# Patient Record
Sex: FEMALE | Race: WHITE | Employment: FULL TIME | URBAN - METROPOLITAN AREA
[De-identification: names, ages, dates, MRNs, and addresses within clinical notes are randomized per-mention and may not be internally consistent; named-entity substitution may affect disease eponyms.]

---

## 2017-01-25 ENCOUNTER — TELEPHONE (OUTPATIENT)
Dept: INTERNAL MEDICINE CLINIC | Age: 52
End: 2017-01-25

## 2017-01-25 ENCOUNTER — OFFICE VISIT (OUTPATIENT)
Dept: INTERNAL MEDICINE CLINIC | Age: 52
End: 2017-01-25

## 2017-01-25 VITALS
HEIGHT: 67 IN | SYSTOLIC BLOOD PRESSURE: 114 MMHG | TEMPERATURE: 97.8 F | OXYGEN SATURATION: 97 % | DIASTOLIC BLOOD PRESSURE: 74 MMHG | HEART RATE: 97 BPM | RESPIRATION RATE: 20 BRPM | BODY MASS INDEX: 26.93 KG/M2 | WEIGHT: 171.6 LBS

## 2017-01-25 DIAGNOSIS — F41.1 GENERALIZED ANXIETY DISORDER: Primary | ICD-10-CM

## 2017-01-25 PROBLEM — K63.5 HYPERPLASTIC COLON POLYP: Status: ACTIVE | Noted: 2017-01-25

## 2017-01-25 RX ORDER — IBUPROFEN 200 MG
TABLET ORAL
COMMUNITY
End: 2017-06-02 | Stop reason: DRUGHIGH

## 2017-01-25 NOTE — PATIENT INSTRUCTIONS

## 2017-01-25 NOTE — PROGRESS NOTES
Chief Complaint   Patient presents with    Medication Evaluation    Colon Cancer Screening     f/u     Room 2

## 2017-01-25 NOTE — MR AVS SNAPSHOT
Visit Information Date & Time Provider Department Dept. Phone Encounter #  
 1/25/2017 10:45 AM Raghav Ibanez MD [address] [facility] Internal Medicine [phone] [id number] Follow-up Instructions Return in about 1 month (around 2/25/2017) for 30 minutes follow-up anxiety, or earlier as needed. Nini Carloz Upcoming Health Maintenance Date Due FOBT Q 1 YEAR AGE 50-75 10/29/2016 BREAST CANCER SCRN MAMMOGRAM 12/5/2018 PAP AKA CERVICAL CYTOLOGY 11/9/2021 DTaP/Tdap/Td series (2 - Td) 11/9/2026 Allergies as of 1/25/2017  Review Complete On: 1/25/2017 By: Conrado Barlow LPN Severity Noted Reaction Type Reactions Pcn [Penicillins]  07/18/2012    Hives Current Immunizations  Never Reviewed Name Date Pneumococcal Polysaccharide (PPSV-23) 11/9/2016 Tdap 11/9/2016 Not reviewed this visit You Were Diagnosed With   
  
 Codes Comments Generalized anxiety disorder    -  Primary ICD-10-CM: F41.1 ICD-9-CM: 300.02 Vitals BP Pulse Temp Resp Height(growth percentile) Weight(growth percentile) 114/74 97 97.8 °F (36.6 °C) (Oral) 20 5' 7\" (1.702 m) 171 lb 9.6 oz (77.8 kg) LMP SpO2 BMI OB Status Smoking Status 06/25/2012 97% 26.88 kg/m2 Postmenopausal Current Every Day Smoker BMI and BSA Data Body Mass Index Body Surface Area  
 26.88 kg/m 2 1.92 m 2 Preferred Pharmacy Pharmacy Name Phone [id number] [place], [place] [phone] Your Updated Medication List  
  
   
This list is accurate as of: 1/25/17 11:37 AM.  Always use your most recent med list.  
  
  
  
  
 amitriptyline 25 mg tablet Commonly known as:  ELAVIL Take 50 mg by mouth nightly. ASPIRIN PO Take 81 mg by mouth daily. cetirizine 10 mg tablet Commonly known as:  ZYRTEC Take 1 Tab by mouth daily. ibuprofen 200 mg tablet Commonly known as:  MOTRIN  
 Take  by mouth. We Performed the Following REFERRAL TO SOCIAL WORK [UKG10 Custom] Comments:  
 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 Follow-up Instructions Return in about 1 month (around 2/25/2017) for 30 minutes follow-up anxiety, or earlier as needed. .  
  
  
Referral Information Referral ID Referred By Referred To  
  
 [id number] Wei Santillan 1 [facility] [place], [address] Phone: [phone] Visits Status Start Date End Date 1 New Request 1/25/17 1/25/18 If your referral has a status of pending review or denied, additional information will be sent to support the outcome of this decision. Patient Instructions Anxiety Disorder: Care Instructions Your Care Instructions Anxiety is a normal reaction to stress. Difficult situations can cause you to have symptoms such as sweaty palms and a nervous feeling. In an anxiety disorder, the symptoms are far more severe. Constant worry, muscle tension, trouble sleeping, nausea and diarrhea, and other symptoms can make normal daily activities difficult or impossible. These symptoms may occur for no reason, and they can affect your work, school, or social life. Medicines, counseling, and self-care can all help. Follow-up care is a key part of your treatment and safety. Be sure to make and go to all appointments, and call your doctor if you are having problems. It's also a good idea to know your test results and keep a list of the medicines you take. How can you care for yourself at home? · Take medicines exactly as directed. Call your doctor if you think you are having a problem with your medicine. · Go to your counseling sessions and follow-up appointments. · Recognize and accept your anxiety.  Then, when you are in a situation that makes you anxious, say to yourself, \"This is not an emergency. I feel uncomfortable, but I am not in danger. I can keep going even if I feel anxious. \" · Be kind to your body: ¨ Relieve tension with exercise or a massage. ¨ Get enough rest. 
¨ Avoid alcohol, caffeine, nicotine, and illegal drugs. They can increase your anxiety level and cause sleep problems. ¨ Learn and do relaxation techniques. See below for more about these techniques. · Engage your mind. Get out and do something you enjoy. Go to a [employer] movie, or take a walk or hike. Plan your day. Having too much or too little to do can make you anxious. · Keep a record of your symptoms. Discuss your fears with a good friend or family member, or join a support group for people with similar problems. Talking to others sometimes relieves stress. · Get involved in social groups, or volunteer to help others. Being alone sometimes makes things seem worse than they are. · Get at least 30 minutes of exercise on most days of the week to relieve stress. Walking is a good choice. You also may want to do other activities, such as running, swimming, cycling, or playing tennis or team sports. Relaxation techniques Do relaxation exercises 10 to 20 minutes a day. You can play soothing, relaxing music while you do them, if you wish. · Tell others in your house that you are going to do your relaxation exercises. Ask them not to disturb you. · Find a comfortable place, away from all distractions and noise. · Lie down on your back, or sit with your back straight. · Focus on your breathing. Make it slow and steady. · Breathe in through your nose. Breathe out through either your nose or mouth. · Breathe deeply, filling up the area between your navel and your rib cage. Breathe so that your belly goes up and down. · Do not hold your breath. · Breathe like this for 5 to 10 minutes. Notice the feeling of calmness throughout your whole body. As you continue to breathe slowly and deeply, relax by doing the following for another 5 to 10 minutes: · Tighten and relax each muscle group in your body. You can begin at your toes and work your way up to your head. · Imagine your muscle groups relaxing and becoming heavy. · Empty your mind of all thoughts. · Let yourself relax more and more deeply. · Become aware of the state of calmness that surrounds you. · When your relaxation time is over, you can bring yourself back to alertness by moving your fingers and toes and then your hands and feet and then stretching and moving your entire body. Sometimes people fall asleep during relaxation, but they usually wake up shortly afterward. · Always give yourself time to return to full alertness before you drive a car or do anything that might cause an accident if you are not fully alert. Never play a relaxation tape while you drive a car. When should you call for help? Call 911 anytime you think you may need emergency care. For example, call if: 
· You feel you cannot stop from hurting yourself or someone else. Keep the numbers for these national suicide hotlines: 3-966-434-TALK (9-653.880.6901) and 4-859-COSAHET (2-471.506.6106). If you or someone you know talks about suicide or feeling hopeless, get help right away. Watch closely for changes in your health, and be sure to contact your doctor if: 
· You have anxiety or fear that affects your life. · You have symptoms of anxiety that are new or different from those you had before. Where can you learn more? Go to http://shaquille-farhana.info/. Enter P754 in the search box to learn more about \"Anxiety Disorder: Care Instructions. \" Current as of: July 26, 2016 Content Version: 11.1 © 4841-2365 [employer].  Care instructions adapted under license by [employer] (which disclaims liability or warranty for this information). If you have questions about a medical condition or this instruction, always ask your healthcare professional. [address] any warranty or liability for your use of this information. Introducing [facility] SERVICES! Vidhi Blakely introduces [employer] patient portal. Now you can access parts of your medical record, email your doctor's office, and request medication refills online. 1. In your internet browser, go to [email or web address] 2. Click on the First Time User? Click Here link in the Sign In box. You will see the New Member Sign Up page. 3. Enter your [employer] Access Code exactly as it appears below. You will not need to use this code after youve completed the sign-up process. If you do not sign up before the expiration date, you must request a new code. · [employer] Access Code: [email or web address] Expires: 4/25/2017 10:16 AM 
 
4. Enter the last four digits of your Social Security Number (xxxx) and Date of Birth (mm/dd/yyyy) as indicated and click Submit. You will be taken to the next sign-up page. 5. Create a [employer] ID. This will be your [employer] login ID and cannot be changed, so think of one that is secure and easy to remember. 6. Create a [employer] password. You can change your password at any time. 7. Enter your Password Reset Question and Answer. This can be used at a later time if you forget your password. 8. Enter your e-mail address. You will receive e-mail notification when new information is available in [address] 9. Click Sign Up. You can now view and download portions of your medical record. 10. Click the Download Summary menu link to download a portable copy of your medical information. If you have questions, please visit the Frequently Asked Questions section of the [employer] website. Remember, [employer] is NOT to be used for urgent needs. For medical emergencies, dial 911. Now available from your iPhone and Android! Please provide this summary of care documentation to your next provider. Your primary care clinician is listed as Venkatesh Kirkpatrick. If you have any questions after today's visit, please call [phone].

## 2017-01-25 NOTE — PROGRESS NOTES
LAKSHMI Ibarra is a 49 y.o. female, she presents today for:    Planning for repeat colonoscopy in 1 year. Has appointment on february first for \"ear thing\",     Notes that she is under a lot of stress. Regained custody of son on May. Feels that this is a major cuases of medication. Was not guardian for 8 years. Just recently regained custodianship. No history of panic attacks. Does not feel that amitriptyline is helping with ringing in years. Currently taking 50mg nightly. Amitriptyline does increase her sleep. PMH/PSH: reviewed and updated  Sochx:  reports that she has been smoking. She has never used smokeless tobacco. She reports that she drinks alcohol. She reports that she does not use illicit drugs. Famhx: reviewed and updated     All: Allergies   Allergen Reactions    Pcn [Penicillins] Hives     Med:   Current Outpatient Prescriptions   Medication Sig    ibuprofen (MOTRIN) 200 mg tablet Take  by mouth.  amitriptyline (ELAVIL) 25 mg tablet Take 50 mg by mouth nightly.  ASPIRIN PO Take 81 mg by mouth daily.  cetirizine (ZYRTEC) 10 mg tablet Take 1 Tab by mouth daily. No current facility-administered medications for this visit. Review of Systems   Constitutional: Negative for chills, fever and weight loss. HENT: Negative for congestion. Respiratory: Negative for cough, shortness of breath and wheezing. Cardiovascular: Negative for chest pain and leg swelling. Gastrointestinal: Negative for abdominal pain, diarrhea, nausea and vomiting. Genitourinary: Negative for dysuria. Skin: Negative for rash. Neurological: Negative for dizziness and headaches. PE:  Blood pressure 114/74, pulse 97, temperature 97.8 °F (36.6 °C), temperature source Oral, resp. rate 20, height 5' 7\" (1.702 m), weight 171 lb 9.6 oz (77.8 kg), last menstrual period 06/25/2012, SpO2 97 %. Body mass index is 26.88 kg/(m^2).   Physical Exam   Constitutional: She is oriented to person, place, and time. She appears well-developed and well-nourished. No distress. HENT:   Head: Normocephalic. Mouth/Throat: Oropharynx is clear and moist.   Eyes: Conjunctivae are normal. Pupils are equal, round, and reactive to light. Neck: Neck supple. Cardiovascular: Normal rate. Pulmonary/Chest: Effort normal.   Neurological: She is alert and oriented to person, place, and time. Skin: No rash noted. Psychiatric:   Calm, not tearful   Nursing note and vitals reviewed. Labs:   No results found for any visits on 01/25/17. A/P:  49 y.o. female    ICD-10-CM ICD-9-CM    1. Generalized anxiety disorder F41.1 300.02 REFERRAL TO SOCIAL WORK     MICHELLE: To follow-up with CSW (has been referred before and not followed through). While on higher dose TCA, will hold on starting SSRI. However per patient report, likely to wean off meidcation with upcoming appointment with ENT. - encouarged her not to focus blame on son for source of anxiety as this is not helpful. Instead to realized this is from within herself, that son may trigger anxieties but this is something that she can learn to improve through therapy. Ongoing smoking: did not respond to wellbutrin. Colon poly: to have follow-up c-scope in 1 year. Follow-up Disposition:  Return in about 1 month (around 2/25/2017) for 30 minutes follow-up anxiety, or earlier as needed. .  No future appointments.

## 2017-03-30 ENCOUNTER — OFFICE VISIT (OUTPATIENT)
Dept: INTERNAL MEDICINE CLINIC | Age: 52
End: 2017-03-30

## 2017-03-30 ENCOUNTER — TELEPHONE (OUTPATIENT)
Dept: INTERNAL MEDICINE CLINIC | Age: 52
End: 2017-03-30

## 2017-03-30 VITALS
RESPIRATION RATE: 20 BRPM | WEIGHT: 178.6 LBS | TEMPERATURE: 98.2 F | SYSTOLIC BLOOD PRESSURE: 131 MMHG | HEIGHT: 67 IN | BODY MASS INDEX: 28.03 KG/M2 | HEART RATE: 70 BPM | OXYGEN SATURATION: 92 % | DIASTOLIC BLOOD PRESSURE: 83 MMHG

## 2017-03-30 DIAGNOSIS — L29.9 SCALP ITCH: Primary | ICD-10-CM

## 2017-03-30 RX ORDER — PERMETHRIN 50 MG/G
CREAM TOPICAL
Qty: 60 G | Refills: 0 | Status: SHIPPED | OUTPATIENT
Start: 2017-03-30 | End: 2017-06-02 | Stop reason: ALTCHOICE

## 2017-03-30 RX ORDER — METHYLPREDNISOLONE 4 MG/1
TABLET ORAL
Qty: 1 DOSE PACK | Refills: 0 | Status: SHIPPED | OUTPATIENT
Start: 2017-03-30 | End: 2017-06-02 | Stop reason: ALTCHOICE

## 2017-03-30 NOTE — TELEPHONE ENCOUNTER
Patient advised medication is indicated for both scabies and head lice. She doubts she has head lice because family members are unaffected.  She was encouraged to take medications prescribed and call back with update

## 2017-03-30 NOTE — PATIENT INSTRUCTIONS
Lice: Care Instructions  Your Care Instructions  Lice are tiny bugs that can live on the human body. They bite you and live on your blood. Lice are found on the head (head lice), the body (pubic lice), or clothing (body lice). Head lice are common in  and elementary school. Some people think this happens because people are not clean. But this is not true. You can treat lice and their eggs (nits) with prescription or over-the-counter medicines. After treatment, your skin may itch for a week or more. This is because of your body's reaction to the bites. Follow-up care is a key part of your treatment and safety. Be sure to make and go to all appointments, and call your doctor if you are having problems. It's also a good idea to know your test results and keep a list of the medicines you take. How can you care for yourself at home? Treat the lice  · Use the medicine, body lotion, or shampoo that your doctor recommends. Use the treatment exactly as directed. · If you have head lice or pubic lice, you may need a second treatment 7 to 10 days after the first. This is to make sure all lice are killed, including those that hatched since the first treatment. Treat itching  · Do not scratch. · Use an over-the-counter cream or calamine lotion to calm the itching. If the itching is really bad, take an over-the-counter antihistamine, such as diphenhydramine (Benadryl) or loratadine (Claritin). Read and follow instructions on the label. Do not give antihistamines to a child unless you have checked with the doctor first.  Remove nits  · After treatment, some nits may survive. You don't have to remove all of the nits. But some people use a comb to remove nits after using lice medicine, because they don't like the look of nits in the hair. The cesar are often packaged with over-the-counter lice shampoos. A flea comb that's made for dogs and cats will also work.   Prevent reinfection with lice  · Soak hairbrushes, cesar, barrettes, and other items for 10 minutes in hot water. The water should be hotter than 130°F.  · Vacuum carpets, upholstery, and mattresses. · Wash all your clothes, bedding, and cloth toys in hot water (hotter than 130°F) in a washing machine. You also can put them in a hot dryer. You can also kill lice on clothes if you dry-clean them. Or you can store the clothes in a plastic bag for 14 days. Do not spread lice  · Do not share your clothes, cesar, or other items until you treat and clean everything. · If you get treated for pubic lice, tell your sex partner or partners. They will also need treatment. When should you call for help? Call your doctor now or seek immediate medical care if:  · You have signs of a skin infection, such as:  ¨ Increased pain, swelling, warmth, and redness. ¨ Red streaks coming from an area of your skin. ¨ Pus draining from an area of your skin. ¨ A fever. Watch closely for changes in your health, and be sure to contact your doctor if:  · You see live lice or new nits after you have followed the directions for your medicine. · Anyone else in your family has lice. Where can you learn more? Go to http://shaquille-farhana.info/. Enter R349 in the search box to learn more about \"Lice: Care Instructions. \"  Current as of: October 13, 2016  Content Version: 11.2  © 5511-6781 [employer]. Care instructions adapted under license by [employer] (which disclaims liability or warranty for this information). If you have questions about a medical condition or this instruction, always ask your healthcare professional. [place] [address] any warranty or liability for your use of this information.

## 2017-03-30 NOTE — TELEPHONE ENCOUNTER
Pt request to speak with Makayla in regards to questions/concerns about the medication that as prescribed to her. Per pt, she was seen by NP Blayne Chisholm today.   #[phone]

## 2017-03-30 NOTE — MR AVS SNAPSHOT
Visit Information Date & Time Provider Department Dept. Phone Encounter #  
 3/30/2017  9:15 AM Cheryle Artis, [facility] [phone] [facility] [phone] [id number] Follow-up Instructions Return if symptoms worsen or fail to improve. Upcoming Health Maintenance Date Due FOBT Q 1 YEAR AGE 50-75 10/29/2016 BREAST CANCER SCRN MAMMOGRAM 12/5/2018 PAP AKA CERVICAL CYTOLOGY 11/9/2021 DTaP/Tdap/Td series (2 - Td) 11/9/2026 Allergies as of 3/30/2017  Review Complete On: 3/30/2017 By: Ascencion Archibald Severity Noted Reaction Type Reactions Pcn [Penicillins]  07/18/2012    Hives Current Immunizations  Never Reviewed Name Date Pneumococcal Polysaccharide (PPSV-23) 11/9/2016 Tdap 11/9/2016 Not reviewed this visit You Were Diagnosed With   
  
 Codes Comments Scalp itch    -  Primary ICD-10-CM: L29.9 ICD-9-CM: 698.9 Vitals BP Pulse Temp Resp Height(growth percentile) Weight(growth percentile) 131/83 (BP 1 Location: Right arm, BP Patient Position: Sitting) 70 98.2 °F (36.8 °C) (Oral) 20 5' 7.01\" (1.702 m) 178 lb 9.6 oz (81 kg) LMP SpO2 BMI OB Status Smoking Status 06/25/2012 92% 27.97 kg/m2 Postmenopausal Current Every Day Smoker BMI and BSA Data Body Mass Index Body Surface Area  
 27.97 kg/m 2 1.96 m 2 Preferred Pharmacy Pharmacy Name Phone [id number] [phone] Your Updated Medication List  
  
   
This list is accurate as of: 3/30/17 11:50 AM.  Always use your most recent med list.  
  
  
  
  
 amitriptyline 25 mg tablet Commonly known as:  ELAVIL Take 50 mg by mouth nightly. ASPIRIN PO Take 81 mg by mouth daily. cetirizine 10 mg tablet Commonly known as:  ZYRTEC Take 1 Tab by mouth daily. ibuprofen 200 mg tablet Commonly known as:  MOTRIN Take  by mouth. methylPREDNISolone 4 mg tablet Commonly known as:  Arielle Nightingale Use per dose pack  
  
 permethrin 5 % topical cream  
Commonly known as:  ACTICIN  
apply sparingly as directed Prescriptions Sent to Pharmacy Refills  
 permethrin (ACTICIN) 5 % topical cream 0 Sig: apply sparingly as directed Class: Normal  
 Pharmacy: [facility]/pharmacy [id number] [place] Ph #: [phone]  
 methylPREDNISolone (MEDROL DOSEPACK) 4 mg tablet 0 Sig: Use per dose pack Class: Normal  
 Pharmacy: [address] [place] [address] Ph #: [phone] We Performed the Following CBC WITH AUTOMATED DIFF [20571 CPT(R)] METABOLIC PANEL, COMPREHENSIVE [32362 CPT(R)] SED RATE (ESR) [id number] CPT(R)] Follow-up Instructions Return if symptoms worsen or fail to improve. Patient Instructions Lice: Care Instructions Your Care Instructions Lice are tiny bugs that can live on the human body. They bite you and live on your blood. Lice are found on the head (head lice), the body (pubic lice), or clothing (body lice). Head lice are common in  and elementary school. Some people think this happens because people are not clean. But this is not true. You can treat lice and their eggs (nits) with prescription or over-the-counter medicines. After treatment, your skin may itch for a week or more. This is because of your body's reaction to the bites. Follow-up care is a key part of your treatment and safety. Be sure to make and go to all appointments, and call your doctor if you are having problems. It's also a good idea to know your test results and keep a list of the medicines you take. How can you care for yourself at home? Treat the lice · Use the medicine, body lotion, or shampoo that your doctor recommends. Use the treatment exactly as directed. · If you have head lice or pubic lice, you may need a second treatment 7 to 10 days after the first. This is to make sure all lice are killed, including those that hatched since the first treatment. Treat itching · Do not scratch. · Use an over-the-counter cream or calamine lotion to calm the itching. If the itching is really bad, take an over-the-counter antihistamine, such as diphenhydramine (Benadryl) or loratadine (Claritin). Read and follow instructions on the label. Do not give antihistamines to a child unless you have checked with the doctor first. 
Remove nits · After treatment, some nits may survive. You don't have to remove all of the nits. But some people use a comb to remove nits after using lice medicine, because they don't like the look of nits in the hair. The cesar are often packaged with over-the-counter lice shampoos. A flea comb that's made for dogs and cats will also work. Prevent reinfection with lice · Soak hairbrushes, cesar, barrettes, and other items for 10 minutes in hot water. The water should be hotter than 130°F. 
· Vacuum carpets, upholstery, and mattresses. · Wash all your clothes, bedding, and cloth toys in hot water (hotter than 130°F) in a washing machine. You also can put them in a hot dryer. You can also kill lice on clothes if you dry-clean them. Or you can store the clothes in a plastic bag for 14 days. Do not spread lice · Do not share your clothes, cesar, or other items until you treat and clean everything. · If you get treated for pubic lice, tell your sex partner or partners. They will also need treatment. When should you call for help? Call your doctor now or seek immediate medical care if: 
· You have signs of a skin infection, such as: 
¨ Increased pain, swelling, warmth, and redness. ¨ Red streaks coming from an area of your skin. ¨ Pus draining from an area of your skin. ¨ A fever.  
Watch closely for changes in your health, and be sure to contact your doctor if: 
· You see live lice or new nits after you have followed the directions for your medicine. · Anyone else in your family has lice. Where can you learn more? Go to http://shaquille-farhana.info/. Enter R349 in the search box to learn more about \"Lice: Care Instructions. \" Current as of: October 13, 2016 Content Version: 11.2 © 7547-0389 [employer]. Care instructions adapted under license by [employer] (which disclaims liability or warranty for this information). If you have questions about a medical condition or this instruction, always ask your healthcare professional. [address] any warranty or liability for your use of this information. Introducing [facility]! Katy Whipple introduces Neonode patient portal. Now you can access parts of your medical record, email your doctor's office, and request medication refills online. 1. In your internet browser, go to [email or web address] 2. Click on the First Time User? Click Here link in the Sign In box. You will see the New Member Sign Up page. 3. Enter your Neonode Access Code exactly as it appears below. You will not need to use this code after youve completed the sign-up process. If you do not sign up before the expiration date, you must request a new code. · Neonode Access Code: [email or web address] Expires: 4/25/2017 11:16 AM 
 
4. Enter the last four digits of your Social Security Number (xxxx) and Date of Birth (mm/dd/yyyy) as indicated and click Submit. You will be taken to the next sign-up page. 5. Create a Neonode ID. This will be your Neonode login ID and cannot be changed, so think of one that is secure and easy to remember. 6. Create a Neonode password. You can change your password at any time. 7. Enter your Password Reset Question and Answer. This can be used at a later time if you forget your password. 8. Enter your e-mail address. You will receive e-mail notification when new information is available in [address] 9. Click Sign Up. You can now view and download portions of your medical record. 10. Click the Download Summary menu link to download a portable copy of your medical information. If you have questions, please visit the Frequently Asked Questions section of the [employer] website. Remember, [employer] is NOT to be used for urgent needs. For medical emergencies, dial 911. Now available from your iPhone and Android! Please provide this summary of care documentation to your next provider. Your primary care clinician is listed as Rohith Moreno. If you have any questions after today's visit, please call [phone].

## 2017-03-30 NOTE — PROGRESS NOTES
RM#9  Chief Complaint   Patient presents with    Rash     head and neck x 2 mo     1. Have you been to the ER, urgent care clinic since your last visit? Hospitalized since your last visit? No    2. Have you seen or consulted any other health care providers outside of the [address] [place] [address] since your last visit? Include any pap smears or colon screening.  No

## 2017-03-30 NOTE — TELEPHONE ENCOUNTER
Pt states her AVS states she has lice, but she does not have lice or was diagnosed with it. She also states the pharmacy advised her that the medication prescribed is for scabies and not for lice. Advised that the medication prescribed can be used for lice or scabies, but would need to discuss further with Bobbi Terrell regarding exact diagnosis and get back with her. She confirmed.

## 2017-03-30 NOTE — LETTER
NOTIFICATION RETURN TO WORK / SCHOOL 
 
3/30/2017 9:43 AM 
 
Ms. Alvaro Loomis 
[address] [facility] [address] [phone] To Whom It May Concern: 
 
Alvaro Loomis is currently under the care of Lorrie. She will return to work/school on: Monday April 3, 2017 If there are questions or concerns please have the patient contact our office. Sincerely, Shad Owens NP
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
## 2017-04-16 DIAGNOSIS — R21 RASH OF HANDS: ICD-10-CM

## 2017-04-16 DIAGNOSIS — R09.81 CHRONIC NASAL CONGESTION: ICD-10-CM

## 2017-04-17 RX ORDER — CETIRIZINE HCL 10 MG
TABLET ORAL
Qty: 30 TAB | Refills: 5 | Status: SHIPPED | OUTPATIENT
Start: 2017-04-17 | End: 2019-08-07 | Stop reason: ALTCHOICE

## 2017-04-20 ENCOUNTER — OFFICE VISIT (OUTPATIENT)
Dept: INTERNAL MEDICINE CLINIC | Age: 52
End: 2017-04-20

## 2017-04-20 VITALS
HEART RATE: 96 BPM | SYSTOLIC BLOOD PRESSURE: 122 MMHG | RESPIRATION RATE: 18 BRPM | HEIGHT: 67 IN | TEMPERATURE: 97.5 F | OXYGEN SATURATION: 99 % | BODY MASS INDEX: 27.31 KG/M2 | WEIGHT: 174 LBS | DIASTOLIC BLOOD PRESSURE: 83 MMHG

## 2017-04-20 DIAGNOSIS — B85.2 LICE INFESTATION: ICD-10-CM

## 2017-04-20 DIAGNOSIS — L30.2 ID REACTION: ICD-10-CM

## 2017-04-20 DIAGNOSIS — Z86.19 HISTORY OF LICE: ICD-10-CM

## 2017-04-20 DIAGNOSIS — L98.9 DERMATOSIS: Primary | ICD-10-CM

## 2017-04-20 RX ORDER — IVERMECTIN 5 MG/G
1 LOTION TOPICAL ONCE
Qty: 1 TUBE | Refills: 0 | Status: SHIPPED | OUTPATIENT
Start: 2017-04-20 | End: 2017-04-20

## 2017-04-20 RX ORDER — CLOBETASOL PROPIONATE 0.46 MG/ML
SOLUTION TOPICAL
Qty: 50 ML | Refills: 1 | Status: SHIPPED | OUTPATIENT
Start: 2017-04-20 | End: 2019-08-07 | Stop reason: ALTCHOICE

## 2017-04-20 NOTE — PATIENT INSTRUCTIONS
Ivermectin (On the skin)   Ivermectin (eye-agapito-MEK-tin)  Treats head lice. Also treats rosacea. Brand Name(s):Sklice, Soolantra   There may be other brand names for this medicine. When This Medicine Should Not Be Used: This medicine is generally considered safe for most people. Talk to your doctor if you have concerns. How to Use This Medicine:   Cream, Lotion  · Wash your hands with soap and water before and after you use this medicine. · If you are using Sklice topical lotion:   ¨ Use this medicine as directed on the hair and scalp only. Do not get the medicine in your eyes, nose, mouth, and vagina. If it gets in your eyes, rinse them well with water. ¨ Children will need an adult to apply the medicine for them. ¨ Apply the medicine directly to dry hair. Start closest to the scalp and then apply outward. Completely cover your entire scalp and all of your hair, from roots to tips. Use the whole tube of medicine, if needed. ¨ Leave the medicine on the hair and scalp for 10 minutes. ¨ After 10 minutes, rinse your hair with warm water only. Dry with a clean towel. Comb hair with a fine-toothed comb to remove any nits (eggs) or nit shells. They look like small white dots. ¨ Throw away any unused medicine. Do not save it to use later. · If you are using Soolantra topical cream:   ¨ Use this medicine as directed. Do not use more medicine or use it more often than your doctor tells you to. ¨ Use this medicine only on your skin. Rinse it off right away if it gets on a cut or scrape. Do not get the medicine in your eyes, nose, or mouth. ¨ Use a pea-sized amount for each area of the face that is affected. Spread a thin layer. Avoid your eyes and lips. ¨ Missed dose: Apply a dose as soon as you can. If it is almost time for your next dose, wait until then and apply a regular dose. Do not apply extra medicine to make up for a missed dose.   · Read and follow the patient instructions that come with this medicine. Talk to your doctor or pharmacist if you have any questions. · Store the medicine in a closed container at room temperature, away from heat, moisture, and direct light. Drugs and Foods to Avoid:      Ask your doctor or pharmacist before using any other medicine, including over-the-counter medicines, vitamins, and herbal products. Warnings While Using This Medicine:   · Tell your doctor if you are pregnant or breastfeeding, or if you have any other skin problems. · For patients using this medicine to treat head lice:  ¨ Do not use this medicine more than once without talking to your doctor first.  Jekalpeshll Cerise everyone in your household 7 days after treatment. If you find any live lice, tell your doctor. ¨ Lice are easily spread from one person to another. To prevent the spread of lice, dry clean or wash your clothes, hats, and bedding in hot, soapy water. Dry in a hot dryer. Also wash personal care items such as brushes in hot, soapy water. · For patients using this medicine to treat rosacea:  ¨ Do not use this medicine to treat a skin problem your doctor has not examined. ¨ Call your doctor if your symptoms do not improve or if they get worse. · Keep all medicine out of the reach of children. Never share your medicine with anyone. Possible Side Effects While Using This Medicine:   Call your doctor right away if you notice any of these side effects:  · Allergic reaction: Itching or hives, swelling in your face or hands, swelling or tingling in your mouth or throat, chest tightness, trouble breathing  If you notice other side effects that you think are caused by this medicine, tell your doctor. Call your doctor for medical advice about side effects. You may report side effects to FDA at 4-714-FDA-5039  © 2016 [address] is for End User's use only and may not be sold, redistributed or otherwise used for commercial purposes.   The above information is an  only. It is not intended as medical advice for individual conditions or treatments. Talk to your doctor, nurse or pharmacist before following any medical regimen to see if it is safe and effective for you.

## 2017-04-20 NOTE — MR AVS SNAPSHOT
Visit Information Date & Time Provider Department Dept. Phone Encounter #  
 4/20/2017  4:30 PM Parish Rendon MD [address] [facility] [phone] Follow-up Instructions Return if symptoms worsen or fail to improve. Upcoming Health Maintenance Date Due FOBT Q 1 YEAR AGE 50-75 10/29/2016 BREAST CANCER SCRN MAMMOGRAM 12/5/2018 PAP AKA CERVICAL CYTOLOGY 11/9/2021 DTaP/Tdap/Td series (2 - Td) 11/9/2026 Allergies as of 4/20/2017  Review Complete On: 4/20/2017 By: Elijah Cornell LPN Severity Noted Reaction Type Reactions Pcn [Penicillins]  07/18/2012    Hives Current Immunizations  Never Reviewed Name Date Pneumococcal Polysaccharide (PPSV-23) 11/9/2016 Tdap 11/9/2016 Not reviewed this visit You Were Diagnosed With   
  
 Codes Comments Dermatosis    -  Primary ICD-10-CM: L98.9 ICD-9-CM: 709.9 History of lice     ANALYKH-07-MD: L41.81 ICD-9-CM: V12.09 Lice infestation     ICD-10-CM: B85.2 ICD-9-CM: 132.9 Vitals BP Pulse Temp Resp Height(growth percentile) Weight(growth percentile) 122/83 (BP 1 Location: Left arm, BP Patient Position: Sitting) 96 97.5 °F (36.4 °C) (Oral) 18 5' 7.01\" (1.702 m) 174 lb (78.9 kg) LMP SpO2 BMI OB Status Smoking Status 06/25/2012 99% 27.24 kg/m2 Postmenopausal Current Every Day Smoker Vitals History BMI and BSA Data Body Mass Index Body Surface Area  
 27.24 kg/m 2 1.93 m 2 Preferred Pharmacy Pharmacy Name Phone [id number] [place], [place] [phone] Your Updated Medication List  
  
   
This list is accurate as of: 4/20/17  5:04 PM.  Always use your most recent med list.  
  
  
  
  
 amitriptyline 25 mg tablet Commonly known as:  ELAVIL Take 50 mg by mouth nightly. ASPIRIN PO Take 81 mg by mouth daily. cetirizine 10 mg tablet Commonly known as:  ZYRTEC  
TAKE 1 TABLET BY MOUTH EVERY DAY  
  
 clobetasol 0.05 % external solution Commonly known as:  Marc Ly Apply to irritated scalp and skin 2 times daily. For up to 2 weeks. ibuprofen 200 mg tablet Commonly known as:  MOTRIN Take  by mouth. ivermectin 0.5 % Lotn Commonly known as:  SKLICE  
1 Tube by Apply Externally route once for 1 dose. Apply to dry scalp and hair, leave in for 10 minutes then rinse off with water. methylPREDNISolone 4 mg tablet Commonly known as:  Lorrane Thierno Use per dose pack  
  
 permethrin 5 % topical cream  
Commonly known as:  ACTICIN  
apply sparingly as directed Prescriptions Printed Refills  
 ivermectin (SKLICE) 0.5 % lotn 0 Si Tube by Apply Externally route once for 1 dose. Apply to dry scalp and hair, leave in for 10 minutes then rinse off with water. Class: Print Route: Apply Externally Prescriptions Sent to Pharmacy Refills  
 clobetasol (TEMOVATE) 0.05 % external solution 1 Sig: Apply to irritated scalp and skin 2 times daily. For up to 2 weeks. Class: Normal  
 Pharmacy: [address] [place] [address] #: [phone] We Performed the Following REFERRAL TO DERMATOLOGY [REF19 Custom] Comments:  
 Please evaluate patient for persistent rash of scalp and hands Follow-up Instructions Return if symptoms worsen or fail to improve. Referral Information Referral ID Referred By Referred To  
  
 [id number] Sasha Carty MD   
   [facility], [address] [place] [address] Phone: [phone] Fax: [phone] Visits Status Start Date End Date 1 New Request 17 If your referral has a status of pending review or denied, additional information will be sent to support the outcome of this decision. Patient Instructions Ivermectin (On the skin) Ivermectin (eye-agapito-MEK-tin) Treats head lice. Also treats rosacea. Brand Name(s):Reggie Yates There may be other brand names for this medicine. When This Medicine Should Not Be Used: This medicine is generally considered safe for most people. Talk to your doctor if you have concerns. How to Use This Medicine:  
Cream, Lotion · Wash your hands with soap and water before and after you use this medicine. · If you are using Sklice topical lotion: ¨ Use this medicine as directed on the hair and scalp only. Do not get the medicine in your eyes, nose, mouth, and vagina. If it gets in your eyes, rinse them well with water. ¨ Children will need an adult to apply the medicine for them. ¨ Apply the medicine directly to dry hair. Start closest to the scalp and then apply outward. Completely cover your entire scalp and all of your hair, from roots to tips. Use the whole tube of medicine, if needed. ¨ Leave the medicine on the hair and scalp for 10 minutes. ¨ After 10 minutes, rinse your hair with warm water only. Dry with a clean towel. Comb hair with a fine-toothed comb to remove any nits (eggs) or nit shells. They look like small white dots. ¨ Throw away any unused medicine. Do not save it to use later. · If you are using Soolantra topical cream: ¨ Use this medicine as directed. Do not use more medicine or use it more often than your doctor tells you to. ¨ Use this medicine only on your skin. Rinse it off right away if it gets on a cut or scrape. Do not get the medicine in your eyes, nose, or mouth. ¨ Use a pea-sized amount for each area of the face that is affected. Spread a thin layer. Avoid your eyes and lips. ¨ Missed dose: Apply a dose as soon as you can. If it is almost time for your next dose, wait until then and apply a regular dose. Do not apply extra medicine to make up for a missed dose. · Read and follow the patient instructions that come with this medicine. Talk to your doctor or pharmacist if you have any questions. · Store the medicine in a closed container at room temperature, away from heat, moisture, and direct light. Drugs and Foods to Avoid: Ask your doctor or pharmacist before using any other medicine, including over-the-counter medicines, vitamins, and herbal products. Warnings While Using This Medicine: · Tell your doctor if you are pregnant or breastfeeding, or if you have any other skin problems. · For patients using this medicine to treat head lice: ¨ Do not use this medicine more than once without talking to your doctor first. 
Eual Caper everyone in your household 7 days after treatment. If you find any live lice, tell your doctor. ¨ Lice are easily spread from one person to another. To prevent the spread of lice, dry clean or wash your clothes, hats, and bedding in hot, soapy water. Dry in a hot dryer. Also wash personal care items such as brushes in hot, soapy water. · For patients using this medicine to treat rosacea: ¨ Do not use this medicine to treat a skin problem your doctor has not examined. ¨ Call your doctor if your symptoms do not improve or if they get worse. · Keep all medicine out of the reach of children. Never share your medicine with anyone. Possible Side Effects While Using This Medicine:  
Call your doctor right away if you notice any of these side effects: · Allergic reaction: Itching or hives, swelling in your face or hands, swelling or tingling in your mouth or throat, chest tightness, trouble breathing If you notice other side effects that you think are caused by this medicine, tell your doctor. Call your doctor for medical advice about side effects. You may report side effects to FDA at 6-673-FDA-5842 © 2016 [address] is for End User's use only and may not be sold, redistributed or otherwise used for commercial purposes. The above information is an  only. It is not intended as medical advice for individual conditions or treatments. Talk to your doctor, nurse or pharmacist before following any medical regimen to see if it is safe and effective for you. Introducing [facility] HE[facility]! Jeimy Higgins introduces [employer] patient portal. Now you can access parts of your medical record, email your doctor's office, and request medication refills online. 1. In your internet browser, go to [email or web address] 2. Click on the First Time User? Click Here link in the Sign In box. You will see the New Member Sign Up page. 3. Enter your [employer] Access Code exactly as it appears below. You will not need to use this code after youve completed the sign-up process. If you do not sign up before the expiration date, you must request a new code. · [employer] Access Code: [email or web address] Expires: 4/25/2017 11:16 AM 
 
4. Enter the last four digits of your Social Security Number (xxxx) and Date of Birth (mm/dd/yyyy) as indicated and click Submit. You will be taken to the next sign-up page. 5. Create a [employer] ID. This will be your [employer] login ID and cannot be changed, so think of one that is secure and easy to remember. 6. Create a [employer] password. You can change your password at any time. 7. Enter your Password Reset Question and Answer. This can be used at a later time if you forget your password. 8. Enter your e-mail address. You will receive e-mail notification when new information is available in [address] 9. Click Sign Up. You can now view and download portions of your medical record. 10. Click the Download Summary menu link to download a portable copy of your medical information.  
 
If you have questions, please visit the Frequently Asked Questions section of the [employer] Remember, [employer] is NOT to be used for urgent needs. For medical emergencies, dial 911. Now available from your iPhone and Android! Please provide this summary of care documentation to your next provider. Your primary care clinician is listed as Olya Doty. If you have any questions after today's visit, please call [phone].

## 2017-04-20 NOTE — PROGRESS NOTES
HPI   Iris Duran is a 49 y.o. female, she presents today for:    Itching of scalp for 2 months. Treated for lice x2, has washed home. No known contacts with lice. Now laso with new itching on back of hands and feet. This rash has occurred previously. PMH/PSH: reviewed and updated  Sochx:  reports that she has been smoking. She has never used smokeless tobacco. She reports that she drinks alcohol. She reports that she does not use illicit drugs. Famhx: reviewed and updated     All: Allergies   Allergen Reactions    Pcn [Penicillins] Hives     Med:   Current Outpatient Prescriptions   Medication Sig    cetirizine (ZYRTEC) 10 mg tablet TAKE 1 TABLET BY MOUTH EVERY DAY    ibuprofen (MOTRIN) 200 mg tablet Take  by mouth.  amitriptyline (ELAVIL) 25 mg tablet Take 50 mg by mouth nightly.  ASPIRIN PO Take 81 mg by mouth daily.  permethrin (ACTICIN) 5 % topical cream apply sparingly as directed    methylPREDNISolone (MEDROL DOSEPACK) 4 mg tablet Use per dose pack     No current facility-administered medications for this visit. Review of Systems   Constitutional: Negative for chills and fever. HENT: Negative for congestion. Respiratory: Negative for cough. PE:  Blood pressure 122/83, pulse 96, temperature 97.5 °F (36.4 °C), temperature source Oral, resp. rate 18, height 5' 7.01\" (1.702 m), weight 174 lb (78.9 kg), last menstrual period 06/25/2012, SpO2 99 %. Body mass index is 27.24 kg/(m^2). Physical Exam   Constitutional: She is oriented to person, place, and time. No distress. HENT:   Head: Normocephalic. Mouth/Throat: Oropharynx is clear and moist.   Eyes: Conjunctivae are normal. Pupils are equal, round, and reactive to light. Neck: Neck supple. Cardiovascular: Normal rate. Pulmonary/Chest: Effort normal.   Neurological: She is alert and oriented to person, place, and time. Skin: No rash noted. With erythematous rash of scalp and neck. Papular in nature on neck. Faint papular rash on top of foot and backs of hands. No involvement of webs. + nits visible on hair shafts, none within 2 cm of scalp. Nursing note and vitals reviewed. A/P:  49 y.o. female    ICD-10-CM ICD-9-CM    1. Dermatosis L98.9 709.9 clobetasol (TEMOVATE) 0.05 % external solution      REFERRAL TO DERMATOLOGY   2. History of lice D96.41 D07.10    3. Lice infestation O17.3 863.2 ivermectin (SKLICE) 0.5 % lotn   4. Id reaction L30.2 692.89      Dermatosis:  Prolonged erythema with evidence of prior lice. Advised to plan to retreat 1 more time. Use clobetasol on affected skin 2 times daily. Call if not somewhat improved in next 4 days. Can use clobetasol up to 2 weeks. Id reaction vs eczema on hands: okay to use clobetasol on this location as well. Follow-up Disposition:  Return if symptoms worsen or fail to improve.

## 2017-04-20 NOTE — PROGRESS NOTES
Rm 2    Chief Complaint   Patient presents with    Hair/Scalp Problem     patient was here last month states the scalp itching has not gotten better     Patient stated that no one in the house hold has this itching but her/  Scalp is bruised from scratching. Patient states the Acticin did not work now appearing on feet and hands  Health Maintenance Due   Topic Date Due    FOBT Q 1 YEAR AGE 50-75  10/29/2016     Patient had colonoscopy in 12/2016    1. Have you been to the ER, urgent care clinic since your last visit? Hospitalized since your last visit? No    2. Have you seen or consulted any other health care providers outside of the [address] [place] [address] since your last visit? Include any pap smears or colon screening.  No    Learning Assessment 9/27/2016   PRIMARY LEARNER Patient   HIGHEST LEVEL OF EDUCATION - PRIMARY LEARNER  DID NOT GRADUATE HIGH SCHOOL   BARRIERS PRIMARY LEARNER NONE   CO-LEARNER CAREGIVER No   PRIMARY LANGUAGE ENGLISH   LEARNER PREFERENCE PRIMARY READING     LISTENING     DEMONSTRATION   ANSWERED BY self   RELATIONSHIP SELF     PHQ 2 / 9, over the last two weeks 3/30/2017   Little interest or pleasure in doing things Not at all   Feeling down, depressed or hopeless Not at all   Total Score PHQ 2 0     Living medical will information given at previous visit

## 2017-06-02 ENCOUNTER — OFFICE VISIT (OUTPATIENT)
Dept: INTERNAL MEDICINE CLINIC | Age: 52
End: 2017-06-02

## 2017-06-02 VITALS
SYSTOLIC BLOOD PRESSURE: 124 MMHG | TEMPERATURE: 98 F | RESPIRATION RATE: 16 BRPM | HEIGHT: 67 IN | OXYGEN SATURATION: 96 % | HEART RATE: 108 BPM | BODY MASS INDEX: 26.84 KG/M2 | WEIGHT: 171 LBS | DIASTOLIC BLOOD PRESSURE: 83 MMHG

## 2017-06-02 DIAGNOSIS — M25.512 PAIN IN LEFT ACROMIOCLAVICULAR JOINT: Primary | ICD-10-CM

## 2017-06-02 DIAGNOSIS — L30.9 DERMATITIS: ICD-10-CM

## 2017-06-02 DIAGNOSIS — M25.511 BILATERAL SHOULDER PAIN, UNSPECIFIED CHRONICITY: ICD-10-CM

## 2017-06-02 DIAGNOSIS — M25.512 BILATERAL SHOULDER PAIN, UNSPECIFIED CHRONICITY: ICD-10-CM

## 2017-06-02 RX ORDER — METHOCARBAMOL 500 MG/1
500 TABLET, FILM COATED ORAL 4 TIMES DAILY
Qty: 40 TAB | Refills: 0 | Status: SHIPPED | OUTPATIENT
Start: 2017-06-02 | End: 2019-08-07 | Stop reason: ALTCHOICE

## 2017-06-02 RX ORDER — OXYCODONE AND ACETAMINOPHEN 5; 325 MG/1; MG/1
1-2 TABLET ORAL
Qty: 15 TAB | Refills: 0 | Status: SHIPPED | OUTPATIENT
Start: 2017-06-02 | End: 2019-08-07 | Stop reason: ALTCHOICE

## 2017-06-02 RX ORDER — NAPROXEN 500 MG/1
500 TABLET ORAL 2 TIMES DAILY WITH MEALS
Qty: 30 TAB | Refills: 0 | Status: SHIPPED | OUTPATIENT
Start: 2017-06-02 | End: 2019-08-07 | Stop reason: ALTCHOICE

## 2017-06-02 RX ORDER — AMITRIPTYLINE HYDROCHLORIDE 50 MG/1
50 TABLET, FILM COATED ORAL
Refills: 3 | COMMUNITY
Start: 2017-05-11 | End: 2017-06-02 | Stop reason: SDUPTHER

## 2017-06-02 NOTE — LETTER
NOTIFICATION RETURN TO WORK / SCHOOL 
 
6/2/2017 9:16 AM 
 
Ms. Kimberlee Adair 
[address] [facility] [phone] To Whom It May Concern: 
 
Kimberlee Adair is currently under the care of Lorrie. She will return to work/school next week as scheduled. Please excuse from work today. When returns to work, please allow limitations of no lifting (even carrying bags of groceries) of more than 15 lbs for 2 weeks (through 6-16-17). If there are questions or concerns please have the patient contact our office.  
 
 
 
Sincerely, 
 
René Ochoa MD

## 2017-06-02 NOTE — PROGRESS NOTES
Rm#16  Chief Complaint   Patient presents with    Shoulder Pain     left shoulder pain down to upper arm, right upper arm and shoulder isnt as bad but does hurt. is tighten to one position. sharp, constant, burning-pain level 9   NEEDS WORK NOTE FOR TODAY   1. Have you been to the ER, urgent care clinic since your last visit? Hospitalized since your last visit? No    2. Have you seen or consulted any other health care providers outside of the [facility] since your last visit? Include any pap smears or colon screening.  No  Health Maintenance Due   Topic Date Due    FOBT Q 1 YEAR AGE 50-75  10/29/2016     PHQ over the last two weeks 6/2/2017   Little interest or pleasure in doing things Not at all   Feeling down, depressed or hopeless Not at all   Total Score PHQ 2 0
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
## 2017-06-02 NOTE — PATIENT INSTRUCTIONS
If the prescription naprosyn is too expensive, you can take EITHER:  --naprosyn 220mg (OTC strength)--2 tabs or 440mg, with food two times daily as needed, OR  --ibuprofen 200mg (OTC strength)--4 tabs or 800mg with food three times daily as needed. Do not take additional Tylenol/acetaminophen with Percocet. Do not drink alcohol with Percocet. There are urgent care orthopedic locations through [address] [place] [address] as below--if pain worsens over weekend or not improving with pain. You can see sports medicine with [facility] with Care Card: [address] [facility] [address],   [facility] [address]   Phone [phone]   Shane Tim MD, [address]   [address] [facility], [address],   [address] [facility]   Phone 120 Flor Bautista MD, [facility]          Urgent Care Orthopaedic Referral Locations:    [address] [facility] [address] [facility], [address] , [facility]. Phone [phone]. [address] [facility], [address] [facility], [address] [facility]  Phone [phone]. [facility]  [facility], [address] [facility], [facility] 23  Phone [phone] ([phone]  **extended hours (5:30-8:30PM [facility])    [facility] Lena Boyce, [address]  Phone [phone]  **extended hours (5:30-8:30PM [facility])    [address] [facility] ([address] [facility] [address])  [address], [address], [address], [address] Phone [phone]   **extended hours (5:30-8:30PM [facility])    [facility], [address] [facility], [address] [facility].   Phone [phone]   **extended hours (5:30-8:30PM Walk-Ins Monday-Saturday)

## 2017-06-02 NOTE — MR AVS SNAPSHOT
Visit Information Date & Time Provider Department Dept. Phone Encounter #  
 6/2/2017  8:30 AM Lillie Jiang, [address] [place] [address] and Internal Medicine [phone] [id number] Follow-up Instructions Return if symptoms worsen or fail to improve. Upcoming Health Maintenance Date Due FOBT Q 1 YEAR AGE 50-75 10/29/2016 INFLUENZA AGE 9 TO ADULT 8/1/2017 BREAST CANCER SCRN MAMMOGRAM 12/5/2018 PAP AKA CERVICAL CYTOLOGY 11/9/2021 DTaP/Tdap/Td series (2 - Td) 11/9/2026 Allergies as of 6/2/2017  Review Complete On: 6/2/2017 By: Lillie Jiang MD  
  
 Severity Noted Reaction Type Reactions Pcn [Penicillins]  07/18/2012    Hives Current Immunizations  Never Reviewed Name Date Pneumococcal Polysaccharide (PPSV-23) 11/9/2016 Tdap 11/9/2016 Not reviewed this visit You Were Diagnosed With   
  
 Codes Comments Pain in left acromioclavicular joint    -  Primary ICD-10-CM: M25.512 ICD-9-CM: 719.41 Bilateral shoulder pain, unspecified chronicity     ICD-10-CM: M25.511, M25.512 ICD-9-CM: 719.41 Vitals BP Pulse Temp Resp Height(growth percentile) Weight(growth percentile) 124/83 (BP 1 Location: Right arm, BP Patient Position: Sitting) (!) 108 98 °F (36.7 °C) (Oral) 16 5' 7\" (1.702 m) 171 lb (77.6 kg) LMP SpO2 BMI OB Status Smoking Status 06/25/2012 96% 26.78 kg/m2 Postmenopausal Current Every Day Smoker Vitals History BMI and BSA Data Body Mass Index Body Surface Area  
 26.78 kg/m 2 1.92 m 2 Preferred Pharmacy Pharmacy Name Phone [id number] [place] [place] [phone] Your Updated Medication List  
  
   
This list is accurate as of: 6/2/17  9:18 AM.  Always use your most recent med list.  
  
  
  
  
 amitriptyline 25 mg tablet Commonly known as:  ELAVIL Take 50 mg by mouth nightly.   
  
 ASPIRIN PO  
 Take 81 mg by mouth daily. cetirizine 10 mg tablet Commonly known as:  ZYRTEC  
TAKE 1 TABLET BY MOUTH EVERY DAY  
  
 clobetasol 0.05 % external solution Commonly known as:  Rosenberg Sandifer Apply to irritated scalp and skin 2 times daily. For up to 2 weeks. methocarbamol 500 mg tablet Commonly known as:  ROBAXIN Take 1 Tab by mouth four (4) times daily. naproxen 500 mg tablet Commonly known as:  NAPROSYN Take 1 Tab by mouth two (2) times daily (with meals). oxyCODONE-acetaminophen 5-325 mg per tablet Commonly known as:  PERCOCET Take 1-2 Tabs by mouth every six (6) hours as needed for Pain. Max Daily Amount: 8 Tabs. WOMEN'S DAILY MULTIVITAMIN PO Take  by mouth. Prescriptions Printed Refills  
 oxyCODONE-acetaminophen (PERCOCET) 5-325 mg per tablet 0 Sig: Take 1-2 Tabs by mouth every six (6) hours as needed for Pain. Max Daily Amount: 8 Tabs. Class: Print Route: Oral  
  
Prescriptions Sent to Pharmacy Refills  
 naproxen (NAPROSYN) 500 mg tablet 0 Sig: Take 1 Tab by mouth two (2) times daily (with meals). Class: Normal  
 Pharmacy: [facility] Ph #: [phone] Route: Oral  
 methocarbamol (ROBAXIN) 500 mg tablet 0 Sig: Take 1 Tab by mouth four (4) times daily. Class: Normal  
 Pharmacy: [facility] Ph #: [phone] Route: Oral  
  
We Performed the Following REFERRAL TO SPORTS MEDICINE [WGO381 Custom] Comments:  
 Please evaluate patient for left > right shoulder pain. Follow-up Instructions Return if symptoms worsen or fail to improve. To-Do List   
 06/02/2017 Imaging:  XR SCAPULA LT Referral Information Referral ID Referred By Referred To  
  
 [id number] Genna Horne MD   
   [address] [facility] [address] Phone: [phone] Fax: [phone] Visits Status Start Date End Date 1 New Request 6/2/17 6/2/18 If your referral has a status of pending review or denied, additional information will be sent to support the outcome of this decision. Patient Instructions If the prescription naprosyn is too expensive, you can take EITHER: 
--naprosyn 220mg (OTC strength)--2 tabs or 440mg, with food two times daily as needed, OR 
--ibuprofen 200mg (OTC strength)--4 tabs or 800mg with food three times daily as needed. Do not take additional Tylenol/acetaminophen with Percocet. Do not drink alcohol with Percocet. There are urgent care orthopedic locations through [address] [place] [address] as below--if pain worsens over weekend or not improving with pain. You can see sports medicine with Gaby Amin with Care Card: [address] [facility], [address] Phone [phone] Sasha Connell. Sylwia Yu MD, CAQ   
 
 OR [place] [facility], [address], [place]John[address] Nay[address] Final Phone [phone] Janette Ruffin MD, CAQSM, [email or web address] Urgent Care Orthopaedic Referral Locations: 
 
[address] Phone [phone]. [address] [place] [address] [address] [facility] [address], [address] [facility], [address] [place] [address] Phone [phone]. [facility] [place] [address] Phone  [phone] **extended hours (5:30-8:30PM Walk-Ins Monday-Saturday) [address], [place], [address] [facility] Phone [phone] **extended hours (5:30-8:30PM Walk-Ins Monday-Saturday) [address] [place] [address] ([facility]) [address] [facility], [address], [place], [address] [place] [address] Ave. Phone [phone] **extended hours (5:30-8:30PM Walk-Ins Monday-Saturday) [address], [facility], [address] [facility] [address] Phone [phone] **extended hours (5:30-8:30PM Walk-Ins Monday-Saturday) Introducing [facility] & HEALTH SERVICES! Rigo Velazquez introduces [employer] Spree patient portal. Now you can access parts of your medical record, email your doctor's office, and request medication refills online. 1. In your internet browser, go to [email or web address] 2. Click on the First Time User? Click Here link in the Sign In box. You will see the New Member Sign Up page. 3. Enter your [employer] Spree Access Code exactly as it appears below. You will not need to use this code after youve completed the sign-up process. If you do not sign up before the expiration date, you must request a new code. · [employer]ree Access Code: [email or web address] Expires: 8/31/2017  8:29 AM 
 
4. Enter the last four digits of your Social Security Number (xxxx) and Date of Birth (mm/dd/yyyy) as indicated and click Submit. You will be taken to the next sign-up page. 5. Create a [employer] Spree ID. This will be your [employer] Spree login ID and cannot be changed, so think of one that is secure and easy to remember. 6. Create a [employer] Spree password. You can change your password at any time. 7. Enter your Password Reset Question and Answer. This can be used at a later time if you forget your password. 8. Enter your e-mail address. You will receive e-mail notification when new information is available in [address] 9. Click Sign Up. You can now view and download portions of your medical record. 10. Click the Download Summary menu link to download a portable copy of your medical information. If you have questions, please visit the Frequently Asked Questions section of the [employer] Spree website. Remember, [employer] Spree is NOT to be used for urgent needs. For medical emergencies, dial 911. Now available from your iPhone and Android! Please provide this summary of care documentation to your next provider. Your primary care clinician is listed as Betsey Ganser. If you have any questions after today's visit, please call [phone].

## 2018-08-06 ENCOUNTER — TELEPHONE (OUTPATIENT)
Dept: INTERNAL MEDICINE CLINIC | Age: 53
End: 2018-08-06

## 2018-08-07 ENCOUNTER — TELEPHONE (OUTPATIENT)
Dept: INTERNAL MEDICINE CLINIC | Age: 53
End: 2018-08-07

## 2018-08-07 NOTE — TELEPHONE ENCOUNTER
Please call patient to schedule appointment for stitch removal.   (see closed telephone encounter)    Thanks    Claudette Gamez MD

## 2018-08-10 ENCOUNTER — OFFICE VISIT (OUTPATIENT)
Dept: INTERNAL MEDICINE CLINIC | Age: 53
End: 2018-08-10

## 2018-08-10 VITALS
BODY MASS INDEX: 25.83 KG/M2 | RESPIRATION RATE: 14 BRPM | HEIGHT: 67 IN | HEART RATE: 85 BPM | WEIGHT: 164.6 LBS | DIASTOLIC BLOOD PRESSURE: 69 MMHG | OXYGEN SATURATION: 97 % | TEMPERATURE: 97.9 F | SYSTOLIC BLOOD PRESSURE: 115 MMHG

## 2018-08-10 DIAGNOSIS — Z48.02 VISIT FOR SUTURE REMOVAL: ICD-10-CM

## 2018-08-10 DIAGNOSIS — S61.412A LACERATION OF LEFT HAND, FOREIGN BODY PRESENCE UNSPECIFIED, INITIAL ENCOUNTER: Primary | ICD-10-CM

## 2018-08-10 RX ORDER — AMITRIPTYLINE HYDROCHLORIDE 50 MG/1
TABLET, FILM COATED ORAL
Refills: 1 | COMMUNITY
Start: 2018-07-18 | End: 2019-08-07 | Stop reason: ALTCHOICE

## 2018-08-10 NOTE — PROGRESS NOTES
Exam room 462 Jenna Gordon is a 47 y.o. female   Visit Vitals    /69 (BP 1 Location: Left arm, BP Patient Position: Sitting)    Pulse 85    Temp 97.9 °F (36.6 °C) (Oral)    Resp 14    Ht 5' 7\" (1.702 m)    Wt 164 lb 9.6 oz (74.7 kg)    SpO2 97%    BMI 25.78 kg/m2       Chief Complaint   Patient presents with    Suture Removal     left hand, index finger and bottom of left wrist   pt is here for suture removal, she states one of the stiches accidentally came out and was bleeding, she called up here and made an appointment. She states that she has been using peroxide and covering it up after doing her routine housework    1. Have you been to the ER, urgent care clinic since your last visit? Hospitalized since your last visit?tuesday july 31, 2018, accidently swiped hand in broken glass door. [facility]    2. Have you seen or consulted any other health care providers outside of the [facility] since your last visit? Include any pap smears or colon screening.  No    Health Maintenance Due   Topic Date Due    FOBT Q 1 YEAR AGE 50-75  10/29/2016    Influenza Age 5 to Adult  08/01/2018    BREAST CANCER SCRN MAMMOGRAM  12/05/2018

## 2018-08-10 NOTE — MR AVS SNAPSHOT
[address] [place]  [address] 
[phone] Patient: Americo Hutchins MRN: [id number] :[id number] Visit Information Date & Time Provider Department Dept. Phone Encounter #  
 8/10/2018 10:15 AM Will Dennis MD [address] [facility] [place] and Internal Medicine [phone] [id number] Follow-up Instructions Return if symptoms worsen or fail to improve. Upcoming Health Maintenance Date Due FOBT Q 1 YEAR AGE 50-75 10/29/2016 Influenza Age 5 to Adult 2018 BREAST CANCER SCRN MAMMOGRAM 2018 PAP AKA CERVICAL CYTOLOGY 2021 DTaP/Tdap/Td series (2 - Td) 2026 Allergies as of 8/10/2018  Review Complete On: 8/10/2018 By: Will Dennis MD  
  
 Severity Noted Reaction Type Reactions Pcn [Penicillins]  2012    Hives Current Immunizations  Never Reviewed Name Date Pneumococcal Polysaccharide (PPSV-23) 2016 Tdap 2016 Not reviewed this visit Vitals BP Pulse Temp Resp Height(growth percentile) Weight(growth percentile)  
 115/69 (BP 1 Location: Left arm, BP Patient Position: Sitting) 85 97.9 °F (36.6 °C) (Oral) 14 5' 7\" (1.702 m) 164 lb 9.6 oz (74.7 kg) LMP SpO2 BMI OB Status Smoking Status 2012 97% 25.78 kg/m2 Postmenopausal Current Every Day Smoker BMI and BSA Data Body Mass Index Body Surface Area 25.78 kg/m 2 1.88 m 2 Preferred Pharmacy Pharmacy Name Phone [id number] [place], [place] [phone] Your Updated Medication List  
  
   
This list is accurate as of 8/10/18 11:31 AM.  Always use your most recent med list.  
  
  
  
  
 * amitriptyline 25 mg tablet Commonly known as:  ELAVIL Take 50 mg by mouth nightly. * amitriptyline 50 mg tablet Commonly known as:  ELAVIL TAKE 1 TABLET BY MOUTH AT BEDTIME  
  
 ASPIRIN PO  
 Take 81 mg by mouth daily. cetirizine 10 mg tablet Commonly known as:  ZYRTEC  
TAKE 1 TABLET BY MOUTH EVERY DAY  
  
 clobetasol 0.05 % external solution Commonly known as:  Acquanetta Doctor Apply to irritated scalp and skin 2 times daily. For up to 2 weeks. methocarbamol 500 mg tablet Commonly known as:  ROBAXIN Take 1 Tab by mouth four (4) times daily. naproxen 500 mg tablet Commonly known as:  NAPROSYN Take 1 Tab by mouth two (2) times daily (with meals). oxyCODONE-acetaminophen 5-325 mg per tablet Commonly known as:  PERCOCET Take 1-2 Tabs by mouth every six (6) hours as needed for Pain. Max Daily Amount: 8 Tabs. WOMEN'S DAILY MULTIVITAMIN PO Take  by mouth. * Notice: This list has 2 medication(s) that are the same as other medications prescribed for you. Read the directions carefully, and ask your doctor or other care provider to review them with you. Follow-up Instructions Return if symptoms worsen or fail to improve. Patient Instructions Learning About Stitches and Staples Removal 
When are stitches and staples removed? Your doctor will tell you when to have your stitches or staples removed, usually in 7 to 14 days. How long you'll be told to wait will depend on things like where the wound is located, how big and how deep the wound is, and what your general health is like. Do not remove the stitches on your own. Stitches on the face are usually removed within a week. But stitches and staples on other areas of the body, such as on the back or belly or over a joint, may need to stay in place longer, often a week or two. Be sure to follow your doctor's instructions. How are stitches and staples removed? It usually doesn't hurt when the doctor removes the stitches or staples. You may feel a tug as each stitch or staple is removed. · You will either be seated or lying down. · To remove stitches, the doctor will use scissors to cut each of the knots and then pull the threads out. · To remove staples, the doctor will use a tool to take out the staples one at a time. · The area may still feel tender after the stitches or staples are gone. But it should feel better within a few minutes or up to a few hours. What can you expect after stitches and staples are removed? Depending on the type and location of the cut, you will have a scar. Scars usually fade over time. Keep the area clean, but you won't need a bandage. When should you call for help? Call your doctor now or seek immediate medical care if : 
· You have new pain, or your pain gets worse. · You have trouble moving the area near the scar. · You have symptoms of infection, such as: 
¨ Increased pain, swelling, warmth, or redness around the scar. ¨ Red streaks leading from the scar. ¨ Pus draining from the scar. ¨ A fever. Watch closely for changes in your health, and be sure to contact your doctor if: · The scar opens. · You do not get better as expected. Follow-up care is a key part of your treatment and safety. Be sure to make and go to all appointments, and call your doctor if you do not get better as expected. It's also a good idea to keep a list of the medicines you take. Where can you learn more? Go to http://shaquille-farhana.info/. Enter B160 in the search box to learn more about \"Learning About Stitches and Staples Removal.\" Current as of: November 20, 2017 Content Version: 11.7 © 6227-5357 [employer]. Care instructions adapted under license by [employer] (which disclaims liability or warranty for this information). If you have questions about a medical condition or this instruction, always ask your healthcare professional. [address] any warranty or liability for your use of this information. Introducing hospitals & HEALTH SERVICES! Kalyani Santos introduces [employer] patient portal. Now you can access parts of your medical record, email your doctor's office, and request medication refills online. 1. In your internet browser, go to [email or web address] 2. Click on the First Time User? Click Here link in the Sign In box. You will see the New Member Sign Up page. 3. Enter your Sk[employer]Boost Access Code exactly as it appears below. You will not need to use this code after youve completed the sign-up process. If you do not sign up before the expiration date, you must request a new code. · [employer]ost Access Code: [email or web address] Expires: 11/8/2018 10:06 AM 
 
4. Enter the last four digits of your Social Security Number (xxxx) and Date of Birth (mm/dd/yyyy) as indicated and click Submit. You will be taken to the next sign-up page. 5. Create a SkillBoost ID. This will be your Sk[employer]Boost login ID and cannot be changed, so think of one that is secure and easy to remember. 6. Create a Sk[employer]Boost password. You can change your password at any time. 7. Enter your Password Reset Question and Answer. This can be used at a later time if you forget your password. 8. Enter your e-mail address. You will receive e-mail notification when new information is available in [address] 9. Click Sign Up. You can now view and download portions of your medical record. 10. Click the Download Summary menu link to download a portable copy of your medical information. If you have questions, please visit the Frequently Asked Questions section of the Sk[employer]Boost website. Remember, Sk[employer] is NOT to be used for urgent needs. For medical emergencies, dial 911. Now available from your iPhone and Android! Please provide this summary of care documentation to your next provider. Your primary care clinician is listed as Minh Merritt. If you have any questions after today's visit, please call [phone].

## 2018-08-10 NOTE — PROGRESS NOTES
ACUTE VISIT     HPI:   Rebekah Cervantes is a 47 y.o. female, she presents today for:   Injury with broken window. Tuesday July 31st sutures were placed. Healing well, notes 1 suture fell out last night when taking out trash    ROS: no pain with movement of hand, no numbness, no tingling. Medications used for acute illness: none    Current Outpatient Prescriptions on File Prior to Visit   Medication Sig    cetirizine (ZYRTEC) 10 mg tablet TAKE 1 TABLET BY MOUTH EVERY DAY    amitriptyline (ELAVIL) 25 mg tablet Take 50 mg by mouth nightly.  ASPIRIN PO Take 81 mg by mouth daily.  MULTIVIT WITH CALCIUM,IRON,MIN (WOMEN'S DAILY MULTIVITAMIN PO) Take  by mouth.  naproxen (NAPROSYN) 500 mg tablet Take 1 Tab by mouth two (2) times daily (with meals).  methocarbamol (ROBAXIN) 500 mg tablet Take 1 Tab by mouth four (4) times daily.  oxyCODONE-acetaminophen (PERCOCET) 5-325 mg per tablet Take 1-2 Tabs by mouth every six (6) hours as needed for Pain. Max Daily Amount: 8 Tabs.  clobetasol (TEMOVATE) 0.05 % external solution Apply to irritated scalp and skin 2 times daily. For up to 2 weeks. No current facility-administered medications on file prior to visit. Allergies   Allergen Reactions    Pcn [Penicillins] Hives       PMH/PSH/FH: reviewed and updated    Sochx:   reports that she has been smoking. She has been smoking about 0.50 packs per day. She has never used smokeless tobacco. She reports that she does not drink alcohol or use illicit drugs. PE:  Blood pressure 115/69, pulse 85, temperature 97.9 °F (36.6 °C), temperature source Oral, resp. rate 14, height 5' 7\" (1.702 m), weight 164 lb 9.6 oz (74.7 kg), last menstrual period 06/25/2012, SpO2 97 %. Body mass index is 25.78 kg/(m^2). Physical Exam   Constitutional: She is oriented to person, place, and time. No distress. HENT:   Head: Normocephalic.    Mouth/Throat: Oropharynx is clear and moist.   Eyes: Conjunctivae are normal. Pupils are equal, round, and reactive to light. Neck: Neck supple. Cardiovascular: Normal rate. Pulmonary/Chest: Effort normal.   Musculoskeletal:   Left hand ring finger with healing incision, there is a little step off between sides of wound. Fore arm wound with good apposition. Neurological: She is alert and oriented to person, place, and time. Skin: No rash noted. Nursing note and vitals reviewed. Removed total of 6 sutures, patient tolerated procedure well despite there being discomfort related to small sutures and difficulty removing without pulling on her skin. Labs:  No results found for any visits on 08/10/18. A/P  Gayle Shepherd was seen today for had concerns including Suture Removal..  The diagnosis and plan was discussed including:        ICD-10-CM ICD-9-CM    1. Laceration of left hand, foreign body presence unspecified, initial encounter S61.412A 882.0 SUTURE / STAPLE REMOVAL BY PROVIDER   2. Visit for suture removal Z48.02 V58.32 SUTURE / STAPLE REMOVAL BY PROVIDER     Sutures succesfully removed, no sign of infection and initial stages of healing completed. Advised to avoid soaking hand or getting wet for long while washing or doing chores around house for additional 1 week. - I advised her to call back or return to office if symptoms worsen/change/persist.  - She was given AVS and expressed understanding with the diagnosis and plan as discussed. Follow-up Disposition:  Return if symptoms worsen or fail to improve.

## 2018-08-10 NOTE — PATIENT INSTRUCTIONS
Learning About Stitches and Staples Removal  When are stitches and staples removed? Your doctor will tell you when to have your stitches or staples removed, usually in 7 to 14 days. How long you'll be told to wait will depend on things like where the wound is located, how big and how deep the wound is, and what your general health is like. Do not remove the stitches on your own. Stitches on the face are usually removed within a week. But stitches and staples on other areas of the body, such as on the back or belly or over a joint, may need to stay in place longer, often a week or two. Be sure to follow your doctor's instructions. How are stitches and staples removed? It usually doesn't hurt when the doctor removes the stitches or staples. You may feel a tug as each stitch or staple is removed. · You will either be seated or lying down. · To remove stitches, the doctor will use scissors to cut each of the knots and then pull the threads out. · To remove staples, the doctor will use a tool to take out the staples one at a time. · The area may still feel tender after the stitches or staples are gone. But it should feel better within a few minutes or up to a few hours. What can you expect after stitches and staples are removed? Depending on the type and location of the cut, you will have a scar. Scars usually fade over time. Keep the area clean, but you won't need a bandage. When should you call for help? Call your doctor now or seek immediate medical care if :  · You have new pain, or your pain gets worse. · You have trouble moving the area near the scar. · You have symptoms of infection, such as:  ¨ Increased pain, swelling, warmth, or redness around the scar. ¨ Red streaks leading from the scar. ¨ Pus draining from the scar. ¨ A fever. Watch closely for changes in your health, and be sure to contact your doctor if:  · The scar opens. · You do not get better as expected.   Follow-up care is a key part of your treatment and safety. Be sure to make and go to all appointments, and call your doctor if you do not get better as expected. It's also a good idea to keep a list of the medicines you take. Where can you learn more? Go to http://shaquille-farhana.info/. Enter M406 in the search box to learn more about \"Learning About Stitches and Staples Removal.\"  Current as of: November 20, 2017  Content Version: 11.7  © 0787-6006 [employer], Incorporated. Care instructions adapted under license by Sand Technology (which disclaims liability or warranty for this information). If you have questions about a medical condition or this instruction, always ask your healthcare professional. [address] any warranty or liability for your use of this information.

## 2019-08-07 ENCOUNTER — OFFICE VISIT (OUTPATIENT)
Dept: OBGYN CLINIC | Age: 54
End: 2019-08-07

## 2019-08-07 VITALS
WEIGHT: 173 LBS | DIASTOLIC BLOOD PRESSURE: 94 MMHG | TEMPERATURE: 96.8 F | BODY MASS INDEX: 27.15 KG/M2 | HEART RATE: 89 BPM | HEIGHT: 67 IN | RESPIRATION RATE: 18 BRPM | SYSTOLIC BLOOD PRESSURE: 134 MMHG

## 2019-08-07 DIAGNOSIS — R39.11 URINARY HESITANCY: ICD-10-CM

## 2019-08-07 DIAGNOSIS — R39.15 URINARY URGENCY: Primary | ICD-10-CM

## 2019-08-07 DIAGNOSIS — N89.8 VAGINAL ODOR: ICD-10-CM

## 2019-08-07 DIAGNOSIS — R10.2 PELVIC PAIN: ICD-10-CM

## 2019-08-07 LAB
BILIRUB UR QL STRIP: NEGATIVE
GLUCOSE UR-MCNC: NEGATIVE MG/DL
KETONES P FAST UR STRIP-MCNC: NEGATIVE MG/DL
PH UR STRIP: 6 [PH] (ref 4.6–8)
PROT UR QL STRIP: NEGATIVE
SP GR UR STRIP: 1.01 (ref 1–1.03)
UA UROBILINOGEN AMB POC: NORMAL (ref 0.2–1)
URINALYSIS CLARITY POC: CLEAR
URINALYSIS COLOR POC: NORMAL
URINE BLOOD POC: NEGATIVE
URINE LEUKOCYTES POC: NEGATIVE
URINE NITRITES POC: NEGATIVE

## 2019-08-07 NOTE — PROGRESS NOTES
Chief Complaint   Patient presents with    Abdominal Pain     Last Pap approximately 2 years ago. Pt states whenever she cough/sneeze she has urine leakage. Pt c/o pain in abdominal area. Pt states the pain is so severe, it feels like her insides are \"ripping. \" Pt states ongoing for 3 years. 1. Have you been to the ER, urgent care clinic since your last visit? Hospitalized since your last visit? No    2. Have you seen or consulted any other health care providers outside of the [address] [place] [address] since your last visit? Include any pap smears or colon screening.  No     3 most recent PHQ Screens 8/7/2019   Little interest or pleasure in doing things Several days   Feeling down, depressed, irritable, or hopeless Nearly every day   Total Score PHQ 2 4
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
## 2019-08-09 LAB
A VAGINAE DNA VAG QL NAA+PROBE: NORMAL SCORE
BVAB2 DNA VAG QL NAA+PROBE: NORMAL SCORE
C ALBICANS DNA VAG QL NAA+PROBE: NEGATIVE
C GLABRATA DNA VAG QL NAA+PROBE: NEGATIVE
MEGA1 DNA VAG QL NAA+PROBE: NORMAL SCORE
T VAGINALIS RRNA SPEC QL NAA+PROBE: NEGATIVE

## 2019-08-12 ENCOUNTER — HOSPITAL ENCOUNTER (OUTPATIENT)
Dept: ULTRASOUND IMAGING | Age: 54
Discharge: HOME | End: 2019-08-12
Attending: NURSE PRACTITIONER
Payer: MEDICAID

## 2019-08-12 PROCEDURE — 76830 TRANSVAGINAL US NON-OB: CPT

## 2019-08-12 PROCEDURE — 76856 US EXAM PELVIC COMPLETE: CPT

## 2019-08-12 NOTE — PROGRESS NOTES
Please notify pt that her pelvic and vaginal ultrasound was reported as all normal.  Please advise her to follow up with the urologist regarding the stress incontinence.

## 2019-08-14 NOTE — PROGRESS NOTES
Called patient advised her of results pt was appreciative and verbalized understanding. Pt reports she has already had f/u appointment with urology.

## 2021-10-29 NOTE — TELEPHONE ENCOUNTER
On call note:    Pt called with a statement that she had an injury in her left ring finger which required stitches a week ago at [facility]. Reports that today it looks slightly red but no pain, swelling or discharge. She is able to move her finger without any pain. She was wondering what she should do. She also reports that she is due for stitch removal.     Advised that since I am not able to evaluate the stitch in person, its difficult for me to give advise but she can go to [facility] in [facility] before 8 pm today  for evaluation. She states that she will wait for tomorrow to call Dr. Zoë Gibson office to make an appointment instead to avoid expenses.
- at home on PO meds  - while in house fs achs, and MDSS for now  - will check a1c      DVT ppx - lovenox  CODE - full  Dispo - pending cardiology recommendations